# Patient Record
Sex: MALE | Race: WHITE | NOT HISPANIC OR LATINO | Employment: FULL TIME | URBAN - METROPOLITAN AREA
[De-identification: names, ages, dates, MRNs, and addresses within clinical notes are randomized per-mention and may not be internally consistent; named-entity substitution may affect disease eponyms.]

---

## 2017-09-20 ENCOUNTER — OFFICE VISIT (OUTPATIENT)
Dept: URGENT CARE | Facility: CLINIC | Age: 27
End: 2017-09-20
Payer: COMMERCIAL

## 2017-09-20 ENCOUNTER — APPOINTMENT (OUTPATIENT)
Dept: RADIOLOGY | Facility: IMAGING CENTER | Age: 27
End: 2017-09-20
Attending: PHYSICIAN ASSISTANT
Payer: COMMERCIAL

## 2017-09-20 VITALS
WEIGHT: 220 LBS | TEMPERATURE: 98.4 F | RESPIRATION RATE: 16 BRPM | DIASTOLIC BLOOD PRESSURE: 90 MMHG | BODY MASS INDEX: 29.16 KG/M2 | HEART RATE: 63 BPM | SYSTOLIC BLOOD PRESSURE: 120 MMHG | HEIGHT: 73 IN | OXYGEN SATURATION: 96 %

## 2017-09-20 DIAGNOSIS — S62.627A CLOSED DISPLACED FRACTURE OF MIDDLE PHALANX OF LEFT LITTLE FINGER, INITIAL ENCOUNTER: ICD-10-CM

## 2017-09-20 DIAGNOSIS — S69.92XA JAMMED FINGER (INTERPHALANGEAL JOINT), LEFT, INITIAL ENCOUNTER: ICD-10-CM

## 2017-09-20 PROCEDURE — 73140 X-RAY EXAM OF FINGER(S): CPT | Mod: 26 | Performed by: PHYSICIAN ASSISTANT

## 2017-09-20 PROCEDURE — 99203 OFFICE O/P NEW LOW 30 MIN: CPT | Performed by: PHYSICIAN ASSISTANT

## 2017-09-20 ASSESSMENT — ENCOUNTER SYMPTOMS
COUGH: 0
MYALGIAS: 0
CONSTITUTIONAL NEGATIVE: 1

## 2017-09-21 NOTE — PROGRESS NOTES
"Subjective:      Jackson Hendricks is a 27 y.o. male who presents with Finger Pain (pinky on left hand, x1wk ago injured, swollen and painful not able to bend)            Subjective: Patient is a 27-year-old male that was playing dog's ball in 12 days ago a ball jammed into his left fifth finger. He states he will use ibuprofen and ice and used a splint but still has significant pain with bending it at the middle joint. He's noticed swelling and bruising so he comes on in for evaluation    Past medical history: Is not pertinent to this examination  Past surgical history: Not pertinent to this examination  Family history: Is not pertinent to this examination  Allergies: No known drug allergies  Social history: Is reviewed in Epic          Review of Systems   Constitutional: Negative.    HENT: Negative.    Respiratory: Negative for cough.    Genitourinary: Negative for dysuria.   Musculoskeletal: Positive for joint pain. Negative for myalgias.          Objective:     /90   Pulse 63   Temp 36.9 °C (98.4 °F)   Resp 16   Ht 1.854 m (6' 1\")   Wt 99.8 kg (220 lb)   SpO2 96%   BMI 29.03 kg/m²      Physical Exam       General: Pleasant male in no acute distress vitals are noted  HEENT is unremarkable  Greatest: Regular rate and rhythm  Lungs clear to auscultation bilaterally  Musculoskeletal: Patient has swelling and a slight amount of bruising noted in the fifth finger of his left hand. He also has decreased ability to flex and extend at the PIP joint region. He is most pain to palpation in the PIP joint region but also the DIP. His MCP joint is able to flex and extend. His wrist and other fingers are not affected. He has 3 out of 5 strength against resistance at the PIP joint of the fifth finger and 4 out of 5 at the DIP against resistance  Neurovascular: Intact with a 2 second cap refill and good distal pulses pulses    Urgent care course: X-ray of the fifth fingerShows mildly displaced fifth finger fracture in the " PIP joint region. A frog splint was placed on the finger     Assessment/Plan:     1. Jammed finger (interphalangeal joint), left, initial encounter    - DX-FINGER(S) 2+ LEFT; Future    2. Mildly displaced fifth finger fracture in the PIP joint region  Splinted with a frog splint  Sent to sports medicine for further evaluation  [place]ice discussed